# Patient Record
Sex: MALE | Race: WHITE | NOT HISPANIC OR LATINO | Employment: UNEMPLOYED | URBAN - METROPOLITAN AREA
[De-identification: names, ages, dates, MRNs, and addresses within clinical notes are randomized per-mention and may not be internally consistent; named-entity substitution may affect disease eponyms.]

---

## 2022-07-16 ENCOUNTER — HOSPITAL ENCOUNTER (INPATIENT)
Facility: HOSPITAL | Age: 47
LOS: 3 days | Discharge: LEFT BEFORE DISCHARGE | End: 2022-07-19
Attending: EMERGENCY MEDICINE | Admitting: INTERNAL MEDICINE

## 2022-07-16 ENCOUNTER — APPOINTMENT (OUTPATIENT)
Dept: CT IMAGING | Facility: HOSPITAL | Age: 47
End: 2022-07-16

## 2022-07-16 DIAGNOSIS — R93.5 ABNORMAL CT OF THE ABDOMEN: ICD-10-CM

## 2022-07-16 DIAGNOSIS — R17 JAUNDICE: ICD-10-CM

## 2022-07-16 DIAGNOSIS — R79.89 ELEVATED LFTS: Primary | ICD-10-CM

## 2022-07-16 LAB
ALBUMIN SERPL-MCNC: 3.7 G/DL (ref 3.5–5.2)
ALBUMIN/GLOB SERPL: 1.3 G/DL
ALP SERPL-CCNC: 198 U/L (ref 39–117)
ALT SERPL W P-5'-P-CCNC: 1433 U/L (ref 1–41)
ANION GAP SERPL CALCULATED.3IONS-SCNC: 10 MMOL/L (ref 5–15)
APAP SERPL-MCNC: <5 MCG/ML (ref 0–30)
AST SERPL-CCNC: 726 U/L (ref 1–40)
BACTERIA UR QL AUTO: ABNORMAL /HPF
BASOPHILS # BLD AUTO: 0.06 10*3/MM3 (ref 0–0.2)
BASOPHILS NFR BLD AUTO: 1.1 % (ref 0–1.5)
BILIRUB CONJ SERPL-MCNC: 8.1 MG/DL (ref 0–0.3)
BILIRUB SERPL-MCNC: 11.7 MG/DL (ref 0–1.2)
BILIRUB UR QL STRIP: ABNORMAL
BUN SERPL-MCNC: 9 MG/DL (ref 6–20)
BUN/CREAT SERPL: 10 (ref 7–25)
CALCIUM SPEC-SCNC: 8.7 MG/DL (ref 8.6–10.5)
CHLORIDE SERPL-SCNC: 104 MMOL/L (ref 98–107)
CLARITY UR: CLEAR
CO2 SERPL-SCNC: 24 MMOL/L (ref 22–29)
COLOR UR: ABNORMAL
CREAT SERPL-MCNC: 0.9 MG/DL (ref 0.76–1.27)
DEPRECATED RDW RBC AUTO: 43.5 FL (ref 37–54)
EGFRCR SERPLBLD CKD-EPI 2021: 106 ML/MIN/1.73
EOSINOPHIL # BLD AUTO: 0.11 10*3/MM3 (ref 0–0.4)
EOSINOPHIL NFR BLD AUTO: 2.1 % (ref 0.3–6.2)
ERYTHROCYTE [DISTWIDTH] IN BLOOD BY AUTOMATED COUNT: 13.7 % (ref 12.3–15.4)
GLOBULIN UR ELPH-MCNC: 2.9 GM/DL
GLUCOSE BLDC GLUCOMTR-MCNC: 96 MG/DL (ref 70–130)
GLUCOSE SERPL-MCNC: 112 MG/DL (ref 65–99)
GLUCOSE UR STRIP-MCNC: NEGATIVE MG/DL
HCT VFR BLD AUTO: 41.7 % (ref 37.5–51)
HGB BLD-MCNC: 13.8 G/DL (ref 13–17.7)
HGB UR QL STRIP.AUTO: NEGATIVE
HOLD SPECIMEN: NORMAL
HOLD SPECIMEN: NORMAL
HYALINE CASTS UR QL AUTO: ABNORMAL /LPF
IGG1 SER-MCNC: 1377 MG/DL (ref 700–1600)
IMM GRANULOCYTES # BLD AUTO: 0.02 10*3/MM3 (ref 0–0.05)
IMM GRANULOCYTES NFR BLD AUTO: 0.4 % (ref 0–0.5)
INR PPP: 1.03 (ref 0.9–1.1)
KETONES UR QL STRIP: NEGATIVE
LEUKOCYTE ESTERASE UR QL STRIP.AUTO: ABNORMAL
LIPASE SERPL-CCNC: 32 U/L (ref 13–60)
LYMPHOCYTES # BLD AUTO: 1.25 10*3/MM3 (ref 0.7–3.1)
LYMPHOCYTES NFR BLD AUTO: 23.3 % (ref 19.6–45.3)
MCH RBC QN AUTO: 29 PG (ref 26.6–33)
MCHC RBC AUTO-ENTMCNC: 33.1 G/DL (ref 31.5–35.7)
MCV RBC AUTO: 87.6 FL (ref 79–97)
MONOCYTES # BLD AUTO: 0.62 10*3/MM3 (ref 0.1–0.9)
MONOCYTES NFR BLD AUTO: 11.6 % (ref 5–12)
NEUTROPHILS NFR BLD AUTO: 3.3 10*3/MM3 (ref 1.7–7)
NEUTROPHILS NFR BLD AUTO: 61.5 % (ref 42.7–76)
NITRITE UR QL STRIP: POSITIVE
NRBC BLD AUTO-RTO: 0 /100 WBC (ref 0–0.2)
PH UR STRIP.AUTO: 6 [PH] (ref 5–8)
PHENYTOIN SERPL-MCNC: <0.8 MCG/ML (ref 10–20)
PLATELET # BLD AUTO: 192 10*3/MM3 (ref 140–450)
PMV BLD AUTO: 10.2 FL (ref 6–12)
POTASSIUM SERPL-SCNC: 3.7 MMOL/L (ref 3.5–5.2)
PROT SERPL-MCNC: 6.6 G/DL (ref 6–8.5)
PROT UR QL STRIP: ABNORMAL
PROTHROMBIN TIME: 13.4 SECONDS (ref 11.7–14.2)
RBC # BLD AUTO: 4.76 10*6/MM3 (ref 4.14–5.8)
RBC # UR STRIP: ABNORMAL /HPF
REF LAB TEST METHOD: ABNORMAL
SARS-COV-2 RNA PNL SPEC NAA+PROBE: NOT DETECTED
SODIUM SERPL-SCNC: 138 MMOL/L (ref 136–145)
SP GR UR STRIP: 1.02 (ref 1–1.03)
SQUAMOUS #/AREA URNS HPF: ABNORMAL /HPF
UROBILINOGEN UR QL STRIP: ABNORMAL
WBC # UR STRIP: ABNORMAL /HPF
WBC NRBC COR # BLD: 5.36 10*3/MM3 (ref 3.4–10.8)
WHOLE BLOOD HOLD COAG: NORMAL
WHOLE BLOOD HOLD SPECIMEN: NORMAL

## 2022-07-16 PROCEDURE — 80053 COMPREHEN METABOLIC PANEL: CPT | Performed by: EMERGENCY MEDICINE

## 2022-07-16 PROCEDURE — 80185 ASSAY OF PHENYTOIN TOTAL: CPT | Performed by: PHYSICIAN ASSISTANT

## 2022-07-16 PROCEDURE — 82962 GLUCOSE BLOOD TEST: CPT

## 2022-07-16 PROCEDURE — 83690 ASSAY OF LIPASE: CPT

## 2022-07-16 PROCEDURE — 99284 EMERGENCY DEPT VISIT MOD MDM: CPT

## 2022-07-16 PROCEDURE — 82248 BILIRUBIN DIRECT: CPT | Performed by: INTERNAL MEDICINE

## 2022-07-16 PROCEDURE — 87635 SARS-COV-2 COVID-19 AMP PRB: CPT | Performed by: PHYSICIAN ASSISTANT

## 2022-07-16 PROCEDURE — 80143 DRUG ASSAY ACETAMINOPHEN: CPT | Performed by: EMERGENCY MEDICINE

## 2022-07-16 PROCEDURE — 85025 COMPLETE CBC W/AUTO DIFF WBC: CPT

## 2022-07-16 PROCEDURE — 85610 PROTHROMBIN TIME: CPT | Performed by: PHYSICIAN ASSISTANT

## 2022-07-16 PROCEDURE — 74176 CT ABD & PELVIS W/O CONTRAST: CPT

## 2022-07-16 PROCEDURE — 82784 ASSAY IGA/IGD/IGG/IGM EACH: CPT | Performed by: INTERNAL MEDICINE

## 2022-07-16 PROCEDURE — 81001 URINALYSIS AUTO W/SCOPE: CPT | Performed by: EMERGENCY MEDICINE

## 2022-07-16 RX ORDER — PHENYTOIN 125 MG/5ML
300 SUSPENSION ORAL 3 TIMES DAILY
Status: DISCONTINUED | OUTPATIENT
Start: 2022-07-16 | End: 2022-07-17

## 2022-07-16 RX ORDER — INSULIN GLARGINE 100 [IU]/ML
10 INJECTION, SOLUTION SUBCUTANEOUS 2 TIMES DAILY
COMMUNITY

## 2022-07-16 RX ORDER — SODIUM CHLORIDE 0.9 % (FLUSH) 0.9 %
10 SYRINGE (ML) INJECTION EVERY 12 HOURS SCHEDULED
Status: DISCONTINUED | OUTPATIENT
Start: 2022-07-16 | End: 2022-07-19 | Stop reason: HOSPADM

## 2022-07-16 RX ORDER — SODIUM CHLORIDE 9 MG/ML
100 INJECTION, SOLUTION INTRAVENOUS CONTINUOUS
Status: DISCONTINUED | OUTPATIENT
Start: 2022-07-16 | End: 2022-07-19 | Stop reason: HOSPADM

## 2022-07-16 RX ORDER — SODIUM CHLORIDE 0.9 % (FLUSH) 0.9 %
10 SYRINGE (ML) INJECTION AS NEEDED
Status: DISCONTINUED | OUTPATIENT
Start: 2022-07-16 | End: 2022-07-19 | Stop reason: HOSPADM

## 2022-07-16 RX ORDER — MORPHINE SULFATE 2 MG/ML
1 INJECTION, SOLUTION INTRAMUSCULAR; INTRAVENOUS EVERY 4 HOURS PRN
Status: DISCONTINUED | OUTPATIENT
Start: 2022-07-16 | End: 2022-07-18

## 2022-07-16 RX ORDER — CHOLECALCIFEROL (VITAMIN D3) 125 MCG
5 CAPSULE ORAL NIGHTLY PRN
Status: DISCONTINUED | OUTPATIENT
Start: 2022-07-16 | End: 2022-07-19 | Stop reason: HOSPADM

## 2022-07-16 RX ORDER — TRAMADOL HYDROCHLORIDE 50 MG/1
50 TABLET ORAL EVERY 6 HOURS PRN
Status: DISCONTINUED | OUTPATIENT
Start: 2022-07-16 | End: 2022-07-18

## 2022-07-16 RX ORDER — NALOXONE HCL 0.4 MG/ML
0.4 VIAL (ML) INJECTION
Status: DISCONTINUED | OUTPATIENT
Start: 2022-07-16 | End: 2022-07-18

## 2022-07-16 RX ORDER — ONDANSETRON 2 MG/ML
4 INJECTION INTRAMUSCULAR; INTRAVENOUS EVERY 6 HOURS PRN
Status: DISCONTINUED | OUTPATIENT
Start: 2022-07-16 | End: 2022-07-19 | Stop reason: HOSPADM

## 2022-07-16 RX ORDER — INSULIN GLARGINE 100 [IU]/ML
10 INJECTION, SOLUTION SUBCUTANEOUS 2 TIMES DAILY
Status: DISCONTINUED | OUTPATIENT
Start: 2022-07-16 | End: 2022-07-16

## 2022-07-16 RX ORDER — AMOXICILLIN 250 MG
2 CAPSULE ORAL 2 TIMES DAILY
Status: DISCONTINUED | OUTPATIENT
Start: 2022-07-16 | End: 2022-07-19 | Stop reason: HOSPADM

## 2022-07-16 RX ORDER — INSULIN LISPRO 100 [IU]/ML
0-9 INJECTION, SOLUTION INTRAVENOUS; SUBCUTANEOUS
Status: DISCONTINUED | OUTPATIENT
Start: 2022-07-16 | End: 2022-07-17

## 2022-07-16 RX ORDER — NICOTINE POLACRILEX 4 MG
15 LOZENGE BUCCAL
Status: DISCONTINUED | OUTPATIENT
Start: 2022-07-16 | End: 2022-07-19 | Stop reason: HOSPADM

## 2022-07-16 RX ORDER — POLYETHYLENE GLYCOL 3350 17 G/17G
17 POWDER, FOR SOLUTION ORAL DAILY PRN
Status: DISCONTINUED | OUTPATIENT
Start: 2022-07-16 | End: 2022-07-19 | Stop reason: HOSPADM

## 2022-07-16 RX ORDER — DEXTROSE MONOHYDRATE 25 G/50ML
25 INJECTION, SOLUTION INTRAVENOUS
Status: DISCONTINUED | OUTPATIENT
Start: 2022-07-16 | End: 2022-07-19 | Stop reason: HOSPADM

## 2022-07-16 RX ORDER — BISACODYL 10 MG
10 SUPPOSITORY, RECTAL RECTAL DAILY PRN
Status: DISCONTINUED | OUTPATIENT
Start: 2022-07-16 | End: 2022-07-19 | Stop reason: HOSPADM

## 2022-07-16 RX ORDER — BISACODYL 5 MG/1
5 TABLET, DELAYED RELEASE ORAL DAILY PRN
Status: DISCONTINUED | OUTPATIENT
Start: 2022-07-16 | End: 2022-07-19 | Stop reason: HOSPADM

## 2022-07-16 RX ADMIN — PHENYTOIN 300 MG: 125 SUSPENSION ORAL at 20:45

## 2022-07-16 RX ADMIN — SODIUM CHLORIDE 100 ML/HR: 9 INJECTION, SOLUTION INTRAVENOUS at 18:07

## 2022-07-16 RX ADMIN — DOCUSATE SODIUM 50MG AND SENNOSIDES 8.6MG 2 TABLET: 8.6; 5 TABLET, FILM COATED ORAL at 20:45

## 2022-07-16 RX ADMIN — TRAMADOL HYDROCHLORIDE 50 MG: 50 TABLET, COATED ORAL at 20:54

## 2022-07-16 NOTE — PLAN OF CARE
Goal Outcome Evaluation:              Outcome Evaluation: Admitted to 4P from ER, jaundiced.  Alert and orientated x 4 yet, not consistent with responses to questions regarding pain and medical history.  Poor historian regarding pharmacy, ect.   Wife at bedside and helpful in assisting to answer some questions.  Eating food brought by wife, appears in no distress.  IV fluids, VSS.

## 2022-07-16 NOTE — ED NOTES
"Nursing report ED to floor  Odell Renteria  47 y.o.  male    HPI :   Chief Complaint   Patient presents with   • Jaundice       Admitting doctor:   Gustabo Guillen MD    Admitting diagnosis:   The primary encounter diagnosis was Elevated LFTs. Diagnoses of Jaundice and Abnormal CT of the abdomen were also pertinent to this visit.    Code status:   Current Code Status     Date Active Code Status Order ID Comments User Context       Not on file    Advance Care Planning Activity          Allergies:   Iodine and Penicillins    Intake and Output  No intake or output data in the 24 hours ending 07/16/22 1146    Weight:       07/16/22  0745   Weight: 90.7 kg (200 lb)       Most recent vitals:   Vitals:    07/16/22 0635 07/16/22 0745 07/16/22 0931   BP: 136/98  118/81   BP Location: Right arm     Patient Position: Standing     Pulse: 91  77   Resp: 16     Temp: 97.6 °F (36.4 °C)     TempSrc: Tympanic     SpO2: 99%  96%   Weight:  90.7 kg (200 lb)    Height: 175.3 cm (69\")         Active LDAs/IV Access:   Lines, Drains & Airways     Active LDAs     Name Placement date Placement time Site Days    Peripheral IV 07/16/22 0741 Right Antecubital 07/16/22 0741  Antecubital  less than 1                Labs (abnormal labs have a star):   Labs Reviewed   COMPREHENSIVE METABOLIC PANEL - Abnormal; Notable for the following components:       Result Value    Glucose 112 (*)     ALT (SGPT) 1,433 (*)     AST (SGOT) 726 (*)     Alkaline Phosphatase 198 (*)     Total Bilirubin 11.7 (*)     All other components within normal limits    Narrative:     GFR Normal >60  Chronic Kidney Disease <60  Kidney Failure <15     URINALYSIS W/ MICROSCOPIC IF INDICATED (NO CULTURE) - Abnormal; Notable for the following components:    Color, UA Dark Yellow (*)     Bilirubin, UA Moderate (2+) (*)     Protein, UA Trace (*)     Leuk Esterase, UA Small (1+) (*)     Nitrite, UA Positive (*)     All other components within normal limits   PHENYTOIN LEVEL, TOTAL - " Abnormal; Notable for the following components:    Phenytoin Level <0.8 (*)     All other components within normal limits   LIPASE - Normal   CBC WITH AUTO DIFFERENTIAL - Normal   PROTIME-INR - Normal   COVID PRE-OP / PRE-PROCEDURE SCREENING ORDER (NO ISOLATION)    Narrative:     The following orders were created for panel order COVID PRE-OP / PRE-PROCEDURE SCREENING ORDER (NO ISOLATION) - Swab, Nasopharynx.  Procedure                               Abnormality         Status                     ---------                               -----------         ------                     COVID-19,[facility]...[[id number]]                      In process                   Please view results for these tests on the individual orders.   COVID-19,[facility] CEPHEID/JESSY, NP SWAB IN TRANSPORT MEDIA 8-12 HR TAT   RAINBOW DRAW    Narrative:     The following orders were created for panel order [place] Draw.  Procedure                               Abnormality         Status                     ---------                               -----------         ------                     Green Top (Gel)[[id number]]                                  Final result               Lavender Top[[id number]]                                     Final result               Gold Top - SST[[id number]]                                   Final result               Light Blue Top[[id number]]                                   Final result                 Please view results for these tests on the individual orders.   ACETAMINOPHEN LEVEL   URINALYSIS, MICROSCOPIC ONLY   CBC AND DIFFERENTIAL    Narrative:     The following orders were created for panel order CBC & Differential.  Procedure                               Abnormality         Status                     ---------                               -----------         ------                     CBC Auto Differential[[id number]]        Normal              Final result                 Please view results  for these tests on the individual orders.   GREEN TOP   LAVENDER TOP   GOLD TOP - SST   LIGHT BLUE TOP       EKG:   No orders to display       Meds given in ED:   Medications   sodium chloride 0.9 % flush 10 mL (has no administration in time range)       Imaging results:  CT Abdomen Pelvis Without Contrast    Result Date: 7/16/2022   Inflammatory stranding in the region of the second portion of the duodenum, could be evidence of duodenitis.  No urolithiasis or hydronephrosis.  Splenomegaly.  This report was finalized on 7/16/2022 9:39 AM by Dr. Desean Coburn M.D.        Ambulatory status:   - ad edwin     Social issues:   Social History     Socioeconomic History   • Marital status:    Tobacco Use   • Smoking status: Unknown If Ever Smoked   Substance and Sexual Activity   • Alcohol use: Not Currently       NIH Stroke Scale:        Nursing report ED to floor:

## 2022-07-16 NOTE — ED NOTES
"Pt ambulatory to triage from home with c/o \"my whole body is yellow.\"  Pt denies any history of liver problems.  Pt has yellow tint to skin and sclera are yellow as well. Pt wearing mask in triage. Triage personnel wore appropriate PPE    "
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
## 2022-07-16 NOTE — ED PROVIDER NOTES
MD ATTESTATION NOTE    The ADAIR and I have discussed this patient's history, physical exam, and treatment plan.    I provided a substantive portion of the care of this patient. I personally performed the physical exam, in its entirety. The attached note describes my personal findings.      Odell Renteria is a 47 y.o. male who presents to the ED c/o jaundice.  He states is been ongoing for the past 2 days.  He reports having left lower quadrant abdominal pain.  He is a rather poor historian. He denies APAP use and any significant etoh use.      On exam:  GENERAL: not distressed  HENT: nares patent  EYES: +scleral icterus  CV: regular rhythm, regular rate  RESPIRATORY: normal effort, clear to auscultation bilaterally  ABDOMEN: soft, generalized abdominal tenderness  MUSCULOSKELETAL: no deformity  NEURO: alert, moves all extremities, follows commands  SKIN: warm, dry, jaundiced    Labs  Recent Results (from the past 24 hour(s))   Comprehensive Metabolic Panel    Collection Time: 07/16/22  7:44 AM    Specimen: Blood   Result Value Ref Range    Glucose 112 (H) 65 - 99 mg/dL    BUN 9 6 - 20 mg/dL    Creatinine 0.90 0.76 - 1.27 mg/dL    Sodium 138 136 - 145 mmol/L    Potassium 3.7 3.5 - 5.2 mmol/L    Chloride 104 98 - 107 mmol/L    CO2 24.0 22.0 - 29.0 mmol/L    Calcium 8.7 8.6 - 10.5 mg/dL    Total Protein 6.6 6.0 - 8.5 g/dL    Albumin 3.70 3.50 - 5.20 g/dL    ALT (SGPT) 1,433 (H) 1 - 41 U/L    AST (SGOT) 726 (H) 1 - 40 U/L    Alkaline Phosphatase 198 (H) 39 - 117 U/L    Total Bilirubin 11.7 (H) 0.0 - 1.2 mg/dL    Globulin 2.9 gm/dL    A/G Ratio 1.3 g/dL    BUN/Creatinine Ratio 10.0 7.0 - 25.0    Anion Gap 10.0 5.0 - 15.0 mmol/L    eGFR 106.0 >60.0 mL/min/1.73   Lipase    Collection Time: 07/16/22  7:44 AM    Specimen: Blood   Result Value Ref Range    Lipase 32 13 - 60 U/L   Green Top (Gel)    Collection Time: 07/16/22  7:44 AM   Result Value Ref Range    Extra Tube Hold for add-ons.    Lavender Top    Collection Time:  07/16/22  7:44 AM   Result Value Ref Range    Extra Tube hold for add-on    Gold Top - SST    Collection Time: 07/16/22  7:44 AM   Result Value Ref Range    Extra Tube Hold for add-ons.    Light Blue Top    Collection Time: 07/16/22  7:44 AM   Result Value Ref Range    Extra Tube Hold for add-ons.    CBC Auto Differential    Collection Time: 07/16/22  7:44 AM    Specimen: Blood   Result Value Ref Range    WBC 5.36 3.40 - 10.80 10*3/mm3    RBC 4.76 4.14 - 5.80 10*6/mm3    Hemoglobin 13.8 13.0 - 17.7 g/dL    Hematocrit 41.7 37.5 - 51.0 %    MCV 87.6 79.0 - 97.0 fL    MCH 29.0 26.6 - 33.0 pg    MCHC 33.1 31.5 - 35.7 g/dL    RDW 13.7 12.3 - 15.4 %    RDW-SD 43.5 37.0 - 54.0 fl    MPV 10.2 6.0 - 12.0 fL    Platelets 192 140 - 450 10*3/mm3    Neutrophil % 61.5 42.7 - 76.0 %    Lymphocyte % 23.3 19.6 - 45.3 %    Monocyte % 11.6 5.0 - 12.0 %    Eosinophil % 2.1 0.3 - 6.2 %    Basophil % 1.1 0.0 - 1.5 %    Immature Grans % 0.4 0.0 - 0.5 %    Neutrophils, Absolute 3.30 1.70 - 7.00 10*3/mm3    Lymphocytes, Absolute 1.25 0.70 - 3.10 10*3/mm3    Monocytes, Absolute 0.62 0.10 - 0.90 10*3/mm3    Eosinophils, Absolute 0.11 0.00 - 0.40 10*3/mm3    Basophils, Absolute 0.06 0.00 - 0.20 10*3/mm3    Immature Grans, Absolute 0.02 0.00 - 0.05 10*3/mm3    nRBC 0.0 0.0 - 0.2 /100 WBC   Protime-INR    Collection Time: 07/16/22  7:44 AM    Specimen: Blood   Result Value Ref Range    Protime 13.4 11.7 - 14.2 Seconds    INR 1.03 0.90 - 1.10   Phenytoin Level, Total    Collection Time: 07/16/22  7:44 AM    Specimen: Blood   Result Value Ref Range    Phenytoin Level <0.8 (L) 10.0 - 20.0 mcg/mL       Radiology  CT Abdomen Pelvis Without Contrast    Result Date: 7/16/2022  CT ABDOMEN PELVIS WO CONTRAST-  INDICATIONS: Jaundice, abdominal swelling  TECHNIQUE: Radiation dose reduction techniques were utilized, including automated exposure control and exposure modulation based on body size. Unenhanced ABDOMEN AND PELVIS CT  COMPARISON: None available   FINDINGS:  The spleen is enlarged, 14.1 cm.  The gallbladder is surgically absent.  The urinary bladder is mostly empty, limiting its assessment.  Otherwise unremarkable unenhanced appearance of the liver, spleen, adrenal glands, pancreas, kidneys, bladder.  No bowel obstruction or abnormal bowel thickening is identified. Inflammatory stranding is apparent in the region of the second portion of the duodenum, could be evidence of duodenitis. The appendix does not appear inflamed.  No free intraperitoneal gas or free fluid.  Borderline prominent portacaval lymph nodes are noted, for example 1.2 cm short axis on axial image 44, nonspecific, clinical correlation and follow-up suggested.  Abdominal aorta is not aneurysmal.  The lung bases are clear.  Degenerative changes are seen in the spine. No acute fracture is identified.         Inflammatory stranding in the region of the second portion of the duodenum, could be evidence of duodenitis.  No urolithiasis or hydronephrosis.  Splenomegaly.  This report was finalized on 7/16/2022 9:39 AM by Dr. Desean Coburn M.D.        Medical Decision Making:  ED Course as of 07/17/22 0011   Sat Jul 16, 2022   1100 Patient's ALT is in the 1400s, AST is in the 700s, and total bilirubin is 11.7.  His CT scan shows inflammatory changes around the duodenum although nonspecific as this can had to be noncontrasted due to his allergy.  CT does also show splenomegaly.  Given these nonspecific findings and clinical presentation he will be admitted for further work-up and evaluation.  Patient agrees plan of care. []   1119 Discussed the patient with Dr. Guillen who agrees to admit. []      ED Course User Index  [AH] Heather Burch PA           PPE: Both the patient and I wore a surgical mask throughout the entire patient encounter. I wore protective goggles.     Diagnosis  Final diagnoses:   Elevated LFTs   Jaundice   Abnormal CT of the abdomen        Wilfredo Murphy II,  MD  07/17/22 0011       Wilfredo Murphy II, MD  07/17/22 0012

## 2022-07-17 ENCOUNTER — APPOINTMENT (OUTPATIENT)
Dept: ULTRASOUND IMAGING | Facility: HOSPITAL | Age: 47
End: 2022-07-17

## 2022-07-17 ENCOUNTER — INPATIENT HOSPITAL (OUTPATIENT)
Dept: URBAN - METROPOLITAN AREA HOSPITAL 113 | Facility: HOSPITAL | Age: 47
End: 2022-07-17
Payer: COMMERCIAL

## 2022-07-17 DIAGNOSIS — R12 HEARTBURN: ICD-10-CM

## 2022-07-17 DIAGNOSIS — R11.10 VOMITING, UNSPECIFIED: ICD-10-CM

## 2022-07-17 DIAGNOSIS — Z90.49 ACQUIRED ABSENCE OF OTHER SPECIFIED PARTS OF DIGESTIVE TRACT: ICD-10-CM

## 2022-07-17 DIAGNOSIS — E80.6 OTHER DISORDERS OF BILIRUBIN METABOLISM: ICD-10-CM

## 2022-07-17 DIAGNOSIS — R74.01 ELEVATION OF LEVELS OF LIVER TRANSAMINASE LEVELS: ICD-10-CM

## 2022-07-17 PROBLEM — F20.9 SCHIZOPHRENIA: Status: ACTIVE | Noted: 2022-07-17

## 2022-07-17 PROBLEM — R56.9 SEIZURES: Status: ACTIVE | Noted: 2022-07-17

## 2022-07-17 PROBLEM — F31.9 BIPOLAR 1 DISORDER: Status: ACTIVE | Noted: 2022-07-17

## 2022-07-17 LAB
ALBUMIN SERPL-MCNC: 3.6 G/DL (ref 3.5–5.2)
ALBUMIN/GLOB SERPL: 1.2 G/DL
ALP SERPL-CCNC: 199 U/L (ref 39–117)
ALT SERPL W P-5'-P-CCNC: 1468 U/L (ref 1–41)
ANION GAP SERPL CALCULATED.3IONS-SCNC: 10 MMOL/L (ref 5–15)
AST SERPL-CCNC: 864 U/L (ref 1–40)
BASOPHILS # BLD AUTO: 0.06 10*3/MM3 (ref 0–0.2)
BASOPHILS NFR BLD AUTO: 1 % (ref 0–1.5)
BILIRUB CONJ SERPL-MCNC: >10 MG/DL (ref 0–0.3)
BILIRUB SERPL-MCNC: 13.3 MG/DL (ref 0–1.2)
BUN SERPL-MCNC: 7 MG/DL (ref 6–20)
BUN/CREAT SERPL: 8.3 (ref 7–25)
CALCIUM SPEC-SCNC: 8.9 MG/DL (ref 8.6–10.5)
CHLORIDE SERPL-SCNC: 105 MMOL/L (ref 98–107)
CO2 SERPL-SCNC: 24 MMOL/L (ref 22–29)
CREAT SERPL-MCNC: 0.84 MG/DL (ref 0.76–1.27)
DEPRECATED RDW RBC AUTO: 41.2 FL (ref 37–54)
EGFRCR SERPLBLD CKD-EPI 2021: 108.2 ML/MIN/1.73
EOSINOPHIL # BLD AUTO: 0.17 10*3/MM3 (ref 0–0.4)
EOSINOPHIL NFR BLD AUTO: 2.9 % (ref 0.3–6.2)
ERYTHROCYTE [DISTWIDTH] IN BLOOD BY AUTOMATED COUNT: 13.5 % (ref 12.3–15.4)
GLOBULIN UR ELPH-MCNC: 2.9 GM/DL
GLUCOSE BLDC GLUCOMTR-MCNC: 103 MG/DL (ref 70–130)
GLUCOSE BLDC GLUCOMTR-MCNC: 112 MG/DL (ref 70–130)
GLUCOSE BLDC GLUCOMTR-MCNC: 88 MG/DL (ref 70–130)
GLUCOSE BLDC GLUCOMTR-MCNC: 94 MG/DL (ref 70–130)
GLUCOSE SERPL-MCNC: 95 MG/DL (ref 65–99)
HAV IGM SERPL QL IA: ABNORMAL
HBA1C MFR BLD: 5 % (ref 4.8–5.6)
HBV CORE IGM SERPL QL IA: REACTIVE
HBV SURFACE AG SERPL QL IA: ABNORMAL
HBV SURFACE AG SERPL QL IA: NORMAL
HCT VFR BLD AUTO: 40.6 % (ref 37.5–51)
HCV AB SER DONR QL: ABNORMAL
HGB BLD-MCNC: 13.9 G/DL (ref 13–17.7)
IMM GRANULOCYTES # BLD AUTO: 0.02 10*3/MM3 (ref 0–0.05)
IMM GRANULOCYTES NFR BLD AUTO: 0.3 % (ref 0–0.5)
LYMPHOCYTES # BLD AUTO: 1.15 10*3/MM3 (ref 0.7–3.1)
LYMPHOCYTES NFR BLD AUTO: 19.8 % (ref 19.6–45.3)
MCH RBC QN AUTO: 29.1 PG (ref 26.6–33)
MCHC RBC AUTO-ENTMCNC: 34.2 G/DL (ref 31.5–35.7)
MCV RBC AUTO: 85.1 FL (ref 79–97)
MONOCYTES # BLD AUTO: 0.7 10*3/MM3 (ref 0.1–0.9)
MONOCYTES NFR BLD AUTO: 12 % (ref 5–12)
NEUTROPHILS NFR BLD AUTO: 3.71 10*3/MM3 (ref 1.7–7)
NEUTROPHILS NFR BLD AUTO: 64 % (ref 42.7–76)
NRBC BLD AUTO-RTO: 0 /100 WBC (ref 0–0.2)
PLATELET # BLD AUTO: 199 10*3/MM3 (ref 140–450)
PMV BLD AUTO: 10.6 FL (ref 6–12)
POTASSIUM SERPL-SCNC: 4.3 MMOL/L (ref 3.5–5.2)
PROT SERPL-MCNC: 6.5 G/DL (ref 6–8.5)
RBC # BLD AUTO: 4.77 10*6/MM3 (ref 4.14–5.8)
SODIUM SERPL-SCNC: 139 MMOL/L (ref 136–145)
WBC NRBC COR # BLD: 5.81 10*3/MM3 (ref 3.4–10.8)

## 2022-07-17 PROCEDURE — 86015 ACTIN ANTIBODY EACH: CPT | Performed by: INTERNAL MEDICINE

## 2022-07-17 PROCEDURE — 25010000002 ONDANSETRON PER 1 MG: Performed by: INTERNAL MEDICINE

## 2022-07-17 PROCEDURE — 83036 HEMOGLOBIN GLYCOSYLATED A1C: CPT | Performed by: INTERNAL MEDICINE

## 2022-07-17 PROCEDURE — 86381 MITOCHONDRIAL ANTIBODY EACH: CPT | Performed by: INTERNAL MEDICINE

## 2022-07-17 PROCEDURE — 87517 HEPATITIS B DNA QUANT: CPT | Performed by: INTERNAL MEDICINE

## 2022-07-17 PROCEDURE — 80074 ACUTE HEPATITIS PANEL: CPT | Performed by: INTERNAL MEDICINE

## 2022-07-17 PROCEDURE — 99222 1ST HOSP IP/OBS MODERATE 55: CPT | Performed by: NURSE PRACTITIONER

## 2022-07-17 PROCEDURE — 85025 COMPLETE CBC W/AUTO DIFF WBC: CPT | Performed by: INTERNAL MEDICINE

## 2022-07-17 PROCEDURE — 82962 GLUCOSE BLOOD TEST: CPT

## 2022-07-17 PROCEDURE — 82248 BILIRUBIN DIRECT: CPT | Performed by: INTERNAL MEDICINE

## 2022-07-17 PROCEDURE — 76705 ECHO EXAM OF ABDOMEN: CPT

## 2022-07-17 PROCEDURE — 87341 HEP B SURFACE AG NEUTRLZJ IA: CPT | Performed by: INTERNAL MEDICINE

## 2022-07-17 PROCEDURE — 80053 COMPREHEN METABOLIC PANEL: CPT | Performed by: INTERNAL MEDICINE

## 2022-07-17 RX ORDER — PHENYTOIN SODIUM 100 MG/1
200 CAPSULE, EXTENDED RELEASE ORAL EVERY 8 HOURS SCHEDULED
Status: DISCONTINUED | OUTPATIENT
Start: 2022-07-17 | End: 2022-07-19 | Stop reason: HOSPADM

## 2022-07-17 RX ORDER — INSULIN LISPRO 100 [IU]/ML
0-7 INJECTION, SOLUTION INTRAVENOUS; SUBCUTANEOUS
Status: DISCONTINUED | OUTPATIENT
Start: 2022-07-17 | End: 2022-07-19 | Stop reason: HOSPADM

## 2022-07-17 RX ORDER — FAMOTIDINE 20 MG/1
20 TABLET, FILM COATED ORAL
Status: DISCONTINUED | OUTPATIENT
Start: 2022-07-17 | End: 2022-07-19 | Stop reason: HOSPADM

## 2022-07-17 RX ORDER — DEXTROSE MONOHYDRATE 25 G/50ML
25 INJECTION, SOLUTION INTRAVENOUS
Status: DISCONTINUED | OUTPATIENT
Start: 2022-07-17 | End: 2022-07-19 | Stop reason: HOSPADM

## 2022-07-17 RX ORDER — NICOTINE POLACRILEX 4 MG
15 LOZENGE BUCCAL
Status: DISCONTINUED | OUTPATIENT
Start: 2022-07-17 | End: 2022-07-19 | Stop reason: HOSPADM

## 2022-07-17 RX ORDER — CALCIUM CARBONATE 200(500)MG
2 TABLET,CHEWABLE ORAL 3 TIMES DAILY PRN
Status: DISCONTINUED | OUTPATIENT
Start: 2022-07-17 | End: 2022-07-19 | Stop reason: HOSPADM

## 2022-07-17 RX ADMIN — DOCUSATE SODIUM 50MG AND SENNOSIDES 8.6MG 2 TABLET: 8.6; 5 TABLET, FILM COATED ORAL at 22:08

## 2022-07-17 RX ADMIN — SODIUM CHLORIDE 100 ML/HR: 9 INJECTION, SOLUTION INTRAVENOUS at 03:00

## 2022-07-17 RX ADMIN — TRAMADOL HYDROCHLORIDE 50 MG: 50 TABLET, COATED ORAL at 22:08

## 2022-07-17 RX ADMIN — PHENYTOIN SODIUM 200 MG: 100 CAPSULE, EXTENDED RELEASE ORAL at 22:09

## 2022-07-17 RX ADMIN — ONDANSETRON 4 MG: 2 INJECTION INTRAMUSCULAR; INTRAVENOUS at 06:41

## 2022-07-17 RX ADMIN — Medication 5 MG: at 22:08

## 2022-07-17 RX ADMIN — FAMOTIDINE 20 MG: 20 TABLET ORAL at 17:34

## 2022-07-17 RX ADMIN — PHENYTOIN SODIUM 200 MG: 100 CAPSULE, EXTENDED RELEASE ORAL at 15:02

## 2022-07-17 RX ADMIN — SODIUM CHLORIDE 100 ML/HR: 9 INJECTION, SOLUTION INTRAVENOUS at 15:00

## 2022-07-17 RX ADMIN — Medication 10 ML: at 22:08

## 2022-07-17 NOTE — PLAN OF CARE
Goal Outcome Evaluation:      Patient rested comfortably this shift. Tramadol given x1 for pain and to help aid with sleep. NS continues at 100mL/hr. Dilantin given as ordered and side rails padded. Wife remains at bedside. Will ctm.
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
## 2022-07-17 NOTE — PAYOR COMM NOTE
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 g/dL 3.60 3.70   BILIRUBIN  mg/dL 13.3* 11.7*   ALK PHOS U/L 199* 198*   AST (SGOT) U/L 864* 726*   ALT (SGPT) U/L 1,468* 1,433*       Amylase and Lipase  Results from last 7 days   Lab Units 22  0744   LIPASE U/L 32       CRP         Imaging Results (Last 24 Hours)     ** No results found for the last 24 hours. **            ASSESSMENT:  47 y.o. male with history of cholecystectomy, seizures, and DM who presents with complaint of jaundice. LFTs elevated, HBV core IgM reactive.  - transaminitis, hyperbilirubinemia - possibly due to acute hepatitis B  - jaundice - r/t above  - vomiting, heartburn  - s/p cholecystectomy       PLAN:  Additional hepatitis B serologies ordered. Check US of liver as well.  ID is consulted.  Give Pepcid BID. Diet as tolerated.   GI will follow. We appreciate the referral.    CAT Snyder  22  11:33 EDT              Electronically signed by Sudheer Lucas MD at 22 1709     Amrita Monique APRN at 22 1030      Consult Orders    1. Inpatient Urology Consult [[id number]] ordered by Dianne La APRN at 22 0903                      FIRST UROLOGY CONSULT      Patient Identification:  NAME:  Odell Renteria  Age:  47 y.o.   Sex:  male   :  1975   MRN:  [id number]       Chief complaint: Jaundice    History of present illness:  Patient is 47 y.o who presented to ED for jaundice and lower abd pain.  He has been seen by our office several times with gross hematuria.  He is suppose to have CT, cytology and cysto done.  CT was done at hospital and is unremarkable from  standpoint.  Patient story changes as he tells it.  States he is having a 'specialist flown in' today to treat his kidneys and abd area.  He states he is peeing blood and has cysto scheduled Monday.  However our records do not indicate that this is scheduled.  Patient is a bit argumentative about his care, and much of his story doesn't make sense.      Past medical history:  Past Medical History:   Diagnosis  Date   • Bipolar disorder (HCC)    • Diabetes mellitus (HCC)    • Schizophrenia (HCC)    • Seizures (HCC)        Past surgical history:  Past Surgical History:   Procedure Laterality Date   • CHOLECYSTECTOMY         Allergies:  Fish-derived products, Latex, Iodine, and Penicillins    Home medications:  Medications Prior to Admission   Medication Sig Dispense Refill Last Dose   • insulin glargine (LANTUS, SEMGLEE) 100 UNIT/ML injection Inject 10 Units under the skin into the appropriate area as directed 2 (Two) Times a Day.   7/15/2022 at 2100   • Phenytoin (DILANTIN PO) Take 200 mg by mouth 3 (Three) Times a Day.   2022 at Unknown time        Hospital medications:  insulin glargine, 10 Units, Subcutaneous, Q12H  insulin lispro, 0-7 Units, Subcutaneous, TID AC  insulin lispro, 0-9 Units, Subcutaneous, TID With Meals  phenytoin, 200 mg, Oral, Q8H  senna-docusate sodium, 2 tablet, Oral, BID  sodium chloride, 10 mL, Intravenous, Q12H      sodium chloride, 100 mL/hr, Last Rate: 100 mL/hr (22 0852)      •  senna-docusate sodium **AND** polyethylene glycol **AND** bisacodyl **AND** bisacodyl  •  calcium carbonate  •  dextrose  •  dextrose  •  dextrose  •  dextrose  •  glucagon (human recombinant)  •  glucagon (human recombinant)  •  melatonin  •  Morphine **AND** naloxone  •  ondansetron  •  sodium chloride  •  sodium chloride  •  traMADol    Family history:  History reviewed. No pertinent family history.    Social history:  Social History     Tobacco Use   • Smoking status: Never Smoker   Substance Use Topics   • Alcohol use: Not Currently       Review of systems:      Positive for:  HEP B  Negative for:  No chest pain    Objective:  TMax 24 hours:   Temp (24hrs), Av °F (36.7 °C), Min:97.6 °F (36.4 °C), Max:98.3 °F (36.8 °C)      Vitals Ranges:   Temp:  [97.6 °F (36.4 °C)-98.3 °F (36.8 °C)] 98.3 °F (36.8 °C)  Heart Rate:  [65-71] 68  Resp:  [16-18] 16  BP: (105-126)/(64-82) 119/78    Intake/Output Last 3  shifts:  I/O last 3 completed shifts:  In: 240 [P.O.:240]  Out: -      Physical Exam:    General Appearance:    Alert, cooperative, NAD   HEENT:    No trauma, pupils reactive, hearing intact   Back:     No CVA tenderness   Lungs:     Respirations unlabored, no wheezing    Heart:    RRR, intact peripheral pulses   Abdomen:     Soft, NDNT, no masses, no guarding   :    Testes descended bilaterally, no nodules.  Penis normal.  No scrotal or penile rashes noted   Extremities:   No edema, no deformity   Lymphatic:   No neck or groin LAD   Skin:   No bleeding, bruising or rashes   Neuro/Psych:   Orientation intact, mood/affect pleasant, no focal findings       Results review:   I reviewed the patient's new clinical results.    Data review:  Lab Results (last 24 hours)     Procedure Component Value Units Date/Time    Hepatitis B Surface Antigen [[id number]] Updated: 07/17/22 0855    Specimen: Blood     Hepatitis Panel, Acute [[id number]]  (Abnormal) Collected: 07/17/22 0641    Specimen: Blood Updated: 07/17/22 0855     Hepatitis B Surface Ag --     Hep A IgM Non-Reactive     Hep B C IgM Reactive     Hepatitis C Ab Non-Reactive    Narrative:      Results may be falsely decreased if patient taking Biotin.   Preliminary reactive result pending confirmation at [facility].     Comprehensive Metabolic Panel [[id number]]  (Abnormal) Collected: 07/17/22 0641    Specimen: Blood Updated: 07/17/22 0851     Glucose 95 mg/dL      BUN 7 mg/dL      Creatinine 0.84 mg/dL      Sodium 139 mmol/L      Potassium 4.3 mmol/L      Chloride 105 mmol/L      CO2 24.0 mmol/L      Calcium 8.9 mg/dL      Total Protein 6.5 g/dL      Albumin 3.60 g/dL      ALT (SGPT) 1,468 U/L      AST (SGOT) 864 U/L      Alkaline Phosphatase 199 U/L      Total Bilirubin 13.3 mg/dL      Globulin 2.9 gm/dL      A/G Ratio 1.2 g/dL      BUN/Creatinine Ratio 8.3     Anion Gap 10.0 mmol/L      eGFR 108.2 mL/min/1.73      Comment: National Kidney Foundation and American Society  of Nephrology (ASN) Task Force recommended calculation based on the Chronic Kidney Disease Epidemiology Collaboration (CKD-EPI) equation refit without adjustment for race.       Narrative:      GFR Normal >60  Chronic Kidney Disease <60  Kidney Failure <15      Bilirubin, Direct [[id number]]  (Abnormal) Collected: 07/17/22 0641    Specimen: Blood Updated: 07/17/22 0743     Bilirubin, Direct >10.0 mg/dL     Hemoglobin A1c [[id number]]  (Normal) Collected: 07/17/22 0641    Specimen: Blood Updated: 07/17/22 0739     Hemoglobin A1C 5.00 %     Narrative:      Hemoglobin A1C Ranges:    Increased Risk for Diabetes  5.7% to 6.4%  Diabetes                     >= 6.5%  Diabetic Goal                < 7.0%    CBC & Differential [[id number]]  (Normal) Collected: 07/17/22 0641    Specimen: Blood Updated: 07/17/22 0722    Narrative:      The following orders were created for panel order CBC & Differential.  Procedure                               Abnormality         Status                     ---------                               -----------         ------                     CBC Auto Differential[[id number]]        Normal              Final result                 Please view results for these tests on the individual orders.    CBC Auto Differential [[id number]]  (Normal) Collected: 07/17/22 0641    Specimen: Blood Updated: 07/17/22 0722     WBC 5.81 10*3/mm3      RBC 4.77 10*6/mm3      Hemoglobin 13.9 g/dL      Hematocrit 40.6 %      MCV 85.1 fL      MCH 29.1 pg      MCHC 34.2 g/dL      RDW 13.5 %      RDW-SD 41.2 fl      MPV 10.6 fL      Platelets 199 10*3/mm3      Neutrophil % 64.0 %      Lymphocyte % 19.8 %      Monocyte % 12.0 %      Eosinophil % 2.9 %      Basophil % 1.0 %      Immature Grans % 0.3 %      Neutrophils, Absolute 3.71 10*3/mm3      Lymphocytes, Absolute 1.15 10*3/mm3      Monocytes, Absolute 0.70 10*3/mm3      Eosinophils, Absolute 0.17 10*3/mm3      Basophils, Absolute 0.06 10*3/mm3      Immature Grans, Absolute  0.02 10*3/mm3      nRBC 0.0 /100 WBC     Mitochondrial Antibodies, M2 [[id number]] Collected: 07/17/22 0641    Specimen: Blood Updated: 07/17/22 0659    Anti-Smooth Muscle Antibody Titer [[id number]] Collected: 07/17/22 0641    Specimen: Blood Updated: 07/17/22 0659    POC Glucose Once [[id number]]  (Normal) Collected: 07/17/22 0632    Specimen: Blood Updated: 07/17/22 0633     Glucose 94 mg/dL      Comment: Meter: [id number] : [id number] Sundarnunoyovanny Ina DARCI       POC Glucose Once [[id number]]  (Normal) Collected: 07/16/22 1944    Specimen: Blood Updated: 07/16/22 1946     Glucose 96 mg/dL      Comment: Meter: [id number] : [id number] Tania Waldron RN       IgG [[id number]]  (Normal) Collected: 07/16/22 0744    Specimen: Blood Updated: 07/16/22 1818     IgG 1,377 mg/dL     Bilirubin, Direct [[id number]]  (Abnormal) Collected: 07/16/22 0744    Specimen: Blood Updated: 07/16/22 1804     Bilirubin, Direct 8.1 mg/dL     Acetaminophen Level [[id number]]  (Normal) Collected: 07/16/22 0744    Specimen: Blood Updated: 07/16/22 1431     Acetaminophen <5.0 mcg/mL     Urinalysis, Microscopic Only - Urine, Clean Catch [[id number]]  (Abnormal) Collected: 07/16/22 1125    Specimen: Urine, Clean Catch Updated: 07/16/22 1157     RBC, UA None Seen /HPF      WBC, UA 6-12 /HPF      Bacteria, UA Trace /HPF      Squamous Epithelial Cells, UA 3-6 /HPF      Hyaline Casts, UA 3-6 /LPF      Methodology Manual Light Microscopy    COVID PRE-OP / PRE-PROCEDURE SCREENING ORDER (NO ISOLATION) - Swab, Nasopharynx [[id number]]  (Normal) Collected: 07/16/22 1124    Specimen: Swab from Nasopharynx Updated: 07/16/22 1155    Narrative:      The following orders were created for panel order COVID PRE-OP / PRE-PROCEDURE SCREENING ORDER (NO ISOLATION) - Swab, Nasopharynx.  Procedure                               Abnormality         Status                     ---------                               -----------         ------                      COVID-19,[facility]...[[id number]]  Normal              Final result                 Please view results for these tests on the individual orders.    COVID-19,[facility] CEPHEID/JESSY NP SWAB IN TRANSPORT MEDIA 8-12 HR TAT - Swab, Nasopharynx [[id number]]  (Normal) Collected: 07/16/22 1124    Specimen: Swab from Nasopharynx Updated: 07/16/22 1155     COVID19 Not Detected    Narrative:      Fact sheet for providers: https://www.fda.gov/media/150726/download    Fact sheet for patients: https://www.fda.gov/media/224413/download    Test performed by PCR.    Urinalysis With Microscopic If Indicated (No Culture) - Urine, Clean Catch [[id number]]  (Abnormal) Collected: 07/16/22 1125    Specimen: Urine, Clean Catch Updated: 07/16/22 1142     Color, UA Dark Yellow     Appearance, UA Clear     pH, UA 6.0     Specific Gravity, UA 1.025     Glucose, UA Negative     Ketones, UA Negative     Bilirubin, UA Moderate (2+)     Blood, UA Negative     Protein, UA Trace     Leuk Esterase, UA Small (1+)     Nitrite, UA Positive     Urobilinogen, UA 1.0 E.U./dL           Imaging:  Imaging Results (Last 24 Hours)     ** No results found for the last 24 hours. **             Assessment:       Elevated LFTs    Schizophrenia (HCC)    Seizures (HCC)    Bipolar 1 disorder (HCC)        Plan:     Hematuria-needs cysto but can be done as outpatient  No further eval from urology    CAT Cano  07/17/22  10:30 EDT              Electronically signed by Amrita Monique APRN at 07/17/22 1037
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
## 2022-07-17 NOTE — CONSULTS
FIRST UROLOGY CONSULT      Patient Identification:  NAME:  Odell Renteria  Age:  47 y.o.   Sex:  male   :  1975   MRN:  [id number]       Chief complaint: Jaundice    History of present illness:  Patient is 47 y.o who presented to ED for jaundice and lower abd pain.  He has been seen by our office several times with gross hematuria.  He is suppose to have CT, cytology and cysto done.  CT was done at hospital and is unremarkable from  standpoint.  Patient story changes as he tells it.  States he is having a 'specialist flown in' today to treat his kidneys and abd area.  He states he is peeing blood and has cysto scheduled Monday.  However our records do not indicate that this is scheduled.  Patient is a bit argumentative about his care, and much of his story doesn't make sense.      Past medical history:  Past Medical History:   Diagnosis Date   • Bipolar disorder (HCC)    • Diabetes mellitus (HCC)    • Schizophrenia (HCC)    • Seizures (HCC)        Past surgical history:  Past Surgical History:   Procedure Laterality Date   • CHOLECYSTECTOMY         Allergies:  Fish-derived products, Latex, Iodine, and Penicillins    Home medications:  Medications Prior to Admission   Medication Sig Dispense Refill Last Dose   • insulin glargine (LANTUS, SEMGLEE) 100 UNIT/ML injection Inject 10 Units under the skin into the appropriate area as directed 2 (Two) Times a Day.   7/15/2022 at 2100   • Phenytoin (DILANTIN PO) Take 200 mg by mouth 3 (Three) Times a Day.   2022 at Unknown time        Hospital medications:  insulin glargine, 10 Units, Subcutaneous, Q12H  insulin lispro, 0-7 Units, Subcutaneous, TID AC  insulin lispro, 0-9 Units, Subcutaneous, TID With Meals  phenytoin, 200 mg, Oral, Q8H  senna-docusate sodium, 2 tablet, Oral, BID  sodium chloride, 10 mL, Intravenous, Q12H      sodium chloride, 100 mL/hr, Last Rate: 100 mL/hr (22 0852)      •  senna-docusate sodium **AND** polyethylene glycol **AND**  bisacodyl **AND** bisacodyl  •  calcium carbonate  •  dextrose  •  dextrose  •  dextrose  •  dextrose  •  glucagon (human recombinant)  •  glucagon (human recombinant)  •  melatonin  •  Morphine **AND** naloxone  •  ondansetron  •  sodium chloride  •  sodium chloride  •  traMADol    Family history:  History reviewed. No pertinent family history.    Social history:  Social History     Tobacco Use   • Smoking status: Never Smoker   Substance Use Topics   • Alcohol use: Not Currently       Review of systems:      Positive for:  HEP B  Negative for:  No chest pain    Objective:  TMax 24 hours:   Temp (24hrs), Av °F (36.7 °C), Min:97.6 °F (36.4 °C), Max:98.3 °F (36.8 °C)      Vitals Ranges:   Temp:  [97.6 °F (36.4 °C)-98.3 °F (36.8 °C)] 98.3 °F (36.8 °C)  Heart Rate:  [65-71] 68  Resp:  [16-18] 16  BP: (105-126)/(64-82) 119/78    Intake/Output Last 3 shifts:  I/O last 3 completed shifts:  In: 240 [P.O.:240]  Out: -      Physical Exam:    General Appearance:    Alert, cooperative, NAD   HEENT:    No trauma, pupils reactive, hearing intact   Back:     No CVA tenderness   Lungs:     Respirations unlabored, no wheezing    Heart:    RRR, intact peripheral pulses   Abdomen:     Soft, NDNT, no masses, no guarding   :    Testes descended bilaterally, no nodules.  Penis normal.  No scrotal or penile rashes noted   Extremities:   No edema, no deformity   Lymphatic:   No neck or groin LAD   Skin:   No bleeding, bruising or rashes   Neuro/Psych:   Orientation intact, mood/affect pleasant, no focal findings       Results review:   I reviewed the patient's new clinical results.    Data review:  Lab Results (last 24 hours)     Procedure Component Value Units Date/Time    Hepatitis B Surface Antigen [[id number]] Updated: 22    Specimen: Blood     Hepatitis Panel, Acute [[id number]]  (Abnormal) Collected: 22    Specimen: Blood Updated: 22     Hepatitis B Surface Ag --     Hep A IgM Non-Reactive      Hep B C IgM Reactive     Hepatitis C Ab Non-Reactive    Narrative:      Results may be falsely decreased if patient taking Biotin.   Preliminary reactive result pending confirmation at [facility].     Comprehensive Metabolic Panel [[id number]]  (Abnormal) Collected: 07/17/22 0641    Specimen: Blood Updated: 07/17/22 0851     Glucose 95 mg/dL      BUN 7 mg/dL      Creatinine 0.84 mg/dL      Sodium 139 mmol/L      Potassium 4.3 mmol/L      Chloride 105 mmol/L      CO2 24.0 mmol/L      Calcium 8.9 mg/dL      Total Protein 6.5 g/dL      Albumin 3.60 g/dL      ALT (SGPT) 1,468 U/L      AST (SGOT) 864 U/L      Alkaline Phosphatase 199 U/L      Total Bilirubin 13.3 mg/dL      Globulin 2.9 gm/dL      A/G Ratio 1.2 g/dL      BUN/Creatinine Ratio 8.3     Anion Gap 10.0 mmol/L      eGFR 108.2 mL/min/1.73      Comment: National Kidney Foundation and American Society of Nephrology (ASN) Task Force recommended calculation based on the Chronic Kidney Disease Epidemiology Collaboration (CKD-EPI) equation refit without adjustment for race.       Narrative:      GFR Normal >60  Chronic Kidney Disease <60  Kidney Failure <15      Bilirubin, Direct [[id number]]  (Abnormal) Collected: 07/17/22 0641    Specimen: Blood Updated: 07/17/22 0743     Bilirubin, Direct >10.0 mg/dL     Hemoglobin A1c [[id number]]  (Normal) Collected: 07/17/22 0641    Specimen: Blood Updated: 07/17/22 0739     Hemoglobin A1C 5.00 %     Narrative:      Hemoglobin A1C Ranges:    Increased Risk for Diabetes  5.7% to 6.4%  Diabetes                     >= 6.5%  Diabetic Goal                < 7.0%    CBC & Differential [[id number]]  (Normal) Collected: 07/17/22 0641    Specimen: Blood Updated: 07/17/22 0722    Narrative:      The following orders were created for panel order CBC & Differential.  Procedure                               Abnormality         Status                     ---------                               -----------         ------                     CBC Auto  Differential[[id number]]        Normal              Final result                 Please view results for these tests on the individual orders.    CBC Auto Differential [[id number]]  (Normal) Collected: 07/17/22 0641    Specimen: Blood Updated: 07/17/22 0722     WBC 5.81 10*3/mm3      RBC 4.77 10*6/mm3      Hemoglobin 13.9 g/dL      Hematocrit 40.6 %      MCV 85.1 fL      MCH 29.1 pg      MCHC 34.2 g/dL      RDW 13.5 %      RDW-SD 41.2 fl      MPV 10.6 fL      Platelets 199 10*3/mm3      Neutrophil % 64.0 %      Lymphocyte % 19.8 %      Monocyte % 12.0 %      Eosinophil % 2.9 %      Basophil % 1.0 %      Immature Grans % 0.3 %      Neutrophils, Absolute 3.71 10*3/mm3      Lymphocytes, Absolute 1.15 10*3/mm3      Monocytes, Absolute 0.70 10*3/mm3      Eosinophils, Absolute 0.17 10*3/mm3      Basophils, Absolute 0.06 10*3/mm3      Immature Grans, Absolute 0.02 10*3/mm3      nRBC 0.0 /100 WBC     Mitochondrial Antibodies, M2 [[id number]] Collected: 07/17/22 0641    Specimen: Blood Updated: 07/17/22 0659    Anti-Smooth Muscle Antibody Titer [[id number]] Collected: 07/17/22 0641    Specimen: Blood Updated: 07/17/22 0659    POC Glucose Once [[id number]]  (Normal) Collected: 07/17/22 0632    Specimen: Blood Updated: 07/17/22 0633     Glucose 94 mg/dL      Comment: Meter: [id number] : [id number] Joel BEJARANO       POC Glucose Once [[id number]]  (Normal) Collected: 07/16/22 1944    Specimen: Blood Updated: 07/16/22 1946     Glucose 96 mg/dL      Comment: Meter: [id number] : [id number] Tania Waldron RN       IgG [[id number]]  (Normal) Collected: 07/16/22 0744    Specimen: Blood Updated: 07/16/22 1818     IgG 1,377 mg/dL     Bilirubin, Direct [[id number]]  (Abnormal) Collected: 07/16/22 0744    Specimen: Blood Updated: 07/16/22 1804     Bilirubin, Direct 8.1 mg/dL     Acetaminophen Level [[id number]]  (Normal) Collected: 07/16/22 0744    Specimen: Blood Updated: 07/16/22 1431     Acetaminophen <5.0 mcg/mL      Urinalysis, Microscopic Only - Urine, Clean Catch [[id number]]  (Abnormal) Collected: 07/16/22 1125    Specimen: Urine, Clean Catch Updated: 07/16/22 1157     RBC, UA None Seen /HPF      WBC, UA 6-12 /HPF      Bacteria, UA Trace /HPF      Squamous Epithelial Cells, UA 3-6 /HPF      Hyaline Casts, UA 3-6 /LPF      Methodology Manual Light Microscopy    COVID PRE-OP / PRE-PROCEDURE SCREENING ORDER (NO ISOLATION) - Swab, Nasopharynx [[id number]]  (Normal) Collected: 07/16/22 1124    Specimen: Swab from Nasopharynx Updated: 07/16/22 1155    Narrative:      The following orders were created for panel order COVID PRE-OP / PRE-PROCEDURE SCREENING ORDER (NO ISOLATION) - Swab, Nasopharynx.  Procedure                               Abnormality         Status                     ---------                               -----------         ------                     COVID-19,[facility]...[[id number]]  Normal              Final result                 Please view results for these tests on the individual orders.    COVID-19,[facility] CEPHEID/JESSY NP SWAB IN TRANSPORT MEDIA 8-12 HR TAT - Swab, Nasopharynx [[id number]]  (Normal) Collected: 07/16/22 1124    Specimen: Swab from Nasopharynx Updated: 07/16/22 1155     COVID19 Not Detected    Narrative:      Fact sheet for providers: https://www.fda.gov/media/746184/download    Fact sheet for patients: https://www.fda.gov/media/846765/download    Test performed by PCR.    Urinalysis With Microscopic If Indicated (No Culture) - Urine, Clean Catch [[id number]]  (Abnormal) Collected: 07/16/22 1125    Specimen: Urine, Clean Catch Updated: 07/16/22 1142     Color, UA Dark Yellow     Appearance, UA Clear     pH, UA 6.0     Specific Gravity, UA 1.025     Glucose, UA Negative     Ketones, UA Negative     Bilirubin, UA Moderate (2+)     Blood, UA Negative     Protein, UA Trace     Leuk Esterase, UA Small (1+)     Nitrite, UA Positive     Urobilinogen, UA 1.0 E.U./dL            Imaging:  Imaging Results (Last 24 Hours)     ** No results found for the last 24 hours. **             Assessment:       Elevated LFTs    Schizophrenia (HCC)    Seizures (HCC)    Bipolar 1 disorder (HCC)        Plan:     Hematuria-needs cysto but can be done as outpatient  No further eval from urology    Amrita Monique, APRMORAIMA  07/17/22  10:30 EDT

## 2022-07-18 LAB
ALBUMIN SERPL-MCNC: 3.4 G/DL (ref 3.5–5.2)
ALBUMIN/GLOB SERPL: 1.2 G/DL
ALP SERPL-CCNC: 183 U/L (ref 39–117)
ALT SERPL W P-5'-P-CCNC: 1434 U/L (ref 1–41)
ANION GAP SERPL CALCULATED.3IONS-SCNC: 9 MMOL/L (ref 5–15)
AST SERPL-CCNC: 810 U/L (ref 1–40)
BASOPHILS # BLD AUTO: 0.05 10*3/MM3 (ref 0–0.2)
BASOPHILS NFR BLD AUTO: 0.9 % (ref 0–1.5)
BILIRUB SERPL-MCNC: 14.2 MG/DL (ref 0–1.2)
BUN SERPL-MCNC: 6 MG/DL (ref 6–20)
BUN/CREAT SERPL: 7.5 (ref 7–25)
CALCIUM SPEC-SCNC: 8.7 MG/DL (ref 8.6–10.5)
CHLORIDE SERPL-SCNC: 104 MMOL/L (ref 98–107)
CO2 SERPL-SCNC: 25 MMOL/L (ref 22–29)
CREAT SERPL-MCNC: 0.8 MG/DL (ref 0.76–1.27)
DEPRECATED RDW RBC AUTO: 44.6 FL (ref 37–54)
EGFRCR SERPLBLD CKD-EPI 2021: 109.8 ML/MIN/1.73
EOSINOPHIL # BLD AUTO: 0.16 10*3/MM3 (ref 0–0.4)
EOSINOPHIL NFR BLD AUTO: 3 % (ref 0.3–6.2)
ERYTHROCYTE [DISTWIDTH] IN BLOOD BY AUTOMATED COUNT: 13.8 % (ref 12.3–15.4)
GLOBULIN UR ELPH-MCNC: 2.8 GM/DL
GLUCOSE BLDC GLUCOMTR-MCNC: 105 MG/DL (ref 70–130)
GLUCOSE BLDC GLUCOMTR-MCNC: 108 MG/DL (ref 70–130)
GLUCOSE BLDC GLUCOMTR-MCNC: 72 MG/DL (ref 70–130)
GLUCOSE BLDC GLUCOMTR-MCNC: 94 MG/DL (ref 70–130)
GLUCOSE SERPL-MCNC: 101 MG/DL (ref 65–99)
HCT VFR BLD AUTO: 40.2 % (ref 37.5–51)
HGB BLD-MCNC: 13.2 G/DL (ref 13–17.7)
IMM GRANULOCYTES # BLD AUTO: 0.02 10*3/MM3 (ref 0–0.05)
IMM GRANULOCYTES NFR BLD AUTO: 0.4 % (ref 0–0.5)
INR PPP: 1.05 (ref 0.9–1.1)
LYMPHOCYTES # BLD AUTO: 1.3 10*3/MM3 (ref 0.7–3.1)
LYMPHOCYTES NFR BLD AUTO: 24.6 % (ref 19.6–45.3)
MCH RBC QN AUTO: 28.9 PG (ref 26.6–33)
MCHC RBC AUTO-ENTMCNC: 32.8 G/DL (ref 31.5–35.7)
MCV RBC AUTO: 88 FL (ref 79–97)
MONOCYTES # BLD AUTO: 0.6 10*3/MM3 (ref 0.1–0.9)
MONOCYTES NFR BLD AUTO: 11.3 % (ref 5–12)
NEUTROPHILS NFR BLD AUTO: 3.16 10*3/MM3 (ref 1.7–7)
NEUTROPHILS NFR BLD AUTO: 59.8 % (ref 42.7–76)
NRBC BLD AUTO-RTO: 0 /100 WBC (ref 0–0.2)
PLATELET # BLD AUTO: 181 10*3/MM3 (ref 140–450)
PMV BLD AUTO: 10.5 FL (ref 6–12)
POTASSIUM SERPL-SCNC: 4.4 MMOL/L (ref 3.5–5.2)
PROT SERPL-MCNC: 6.2 G/DL (ref 6–8.5)
PROTHROMBIN TIME: 13.6 SECONDS (ref 11.7–14.2)
RBC # BLD AUTO: 4.57 10*6/MM3 (ref 4.14–5.8)
SODIUM SERPL-SCNC: 138 MMOL/L (ref 136–145)
WBC NRBC COR # BLD: 5.29 10*3/MM3 (ref 3.4–10.8)

## 2022-07-18 PROCEDURE — 99232 SBSQ HOSP IP/OBS MODERATE 35: CPT | Performed by: NURSE PRACTITIONER

## 2022-07-18 PROCEDURE — 82962 GLUCOSE BLOOD TEST: CPT

## 2022-07-18 PROCEDURE — 80053 COMPREHEN METABOLIC PANEL: CPT | Performed by: INTERNAL MEDICINE

## 2022-07-18 PROCEDURE — 63710000001 DIPHENHYDRAMINE PER 50 MG: Performed by: INTERNAL MEDICINE

## 2022-07-18 PROCEDURE — 85610 PROTHROMBIN TIME: CPT | Performed by: INTERNAL MEDICINE

## 2022-07-18 PROCEDURE — 85025 COMPLETE CBC W/AUTO DIFF WBC: CPT | Performed by: INTERNAL MEDICINE

## 2022-07-18 PROCEDURE — 25010000002 ONDANSETRON PER 1 MG: Performed by: INTERNAL MEDICINE

## 2022-07-18 RX ORDER — OXYCODONE HYDROCHLORIDE 5 MG/1
5 TABLET ORAL EVERY 4 HOURS PRN
Status: DISCONTINUED | OUTPATIENT
Start: 2022-07-18 | End: 2022-07-19 | Stop reason: HOSPADM

## 2022-07-18 RX ORDER — NALOXONE HCL 0.4 MG/ML
0.4 VIAL (ML) INJECTION
Status: DISCONTINUED | OUTPATIENT
Start: 2022-07-18 | End: 2022-07-19 | Stop reason: HOSPADM

## 2022-07-18 RX ORDER — MORPHINE SULFATE 2 MG/ML
2 INJECTION, SOLUTION INTRAMUSCULAR; INTRAVENOUS
Status: DISCONTINUED | OUTPATIENT
Start: 2022-07-18 | End: 2022-07-19 | Stop reason: HOSPADM

## 2022-07-18 RX ORDER — DIPHENHYDRAMINE HCL 25 MG
25 CAPSULE ORAL EVERY 6 HOURS PRN
Status: DISCONTINUED | OUTPATIENT
Start: 2022-07-18 | End: 2022-07-19 | Stop reason: HOSPADM

## 2022-07-18 RX ADMIN — POLYETHYLENE GLYCOL 3350 17 G: 17 POWDER, FOR SOLUTION ORAL at 21:56

## 2022-07-18 RX ADMIN — ONDANSETRON 4 MG: 2 INJECTION INTRAMUSCULAR; INTRAVENOUS at 14:11

## 2022-07-18 RX ADMIN — FAMOTIDINE 20 MG: 20 TABLET ORAL at 08:30

## 2022-07-18 RX ADMIN — Medication 10 ML: at 08:30

## 2022-07-18 RX ADMIN — SODIUM CHLORIDE 100 ML/HR: 9 INJECTION, SOLUTION INTRAVENOUS at 12:19

## 2022-07-18 RX ADMIN — PHENYTOIN SODIUM 200 MG: 100 CAPSULE, EXTENDED RELEASE ORAL at 21:56

## 2022-07-18 RX ADMIN — DIPHENHYDRAMINE HYDROCHLORIDE 25 MG: 25 CAPSULE ORAL at 17:38

## 2022-07-18 RX ADMIN — Medication 5 MG: at 21:56

## 2022-07-18 RX ADMIN — SODIUM CHLORIDE 100 ML/HR: 9 INJECTION, SOLUTION INTRAVENOUS at 01:33

## 2022-07-18 RX ADMIN — PHENYTOIN SODIUM 200 MG: 100 CAPSULE, EXTENDED RELEASE ORAL at 14:07

## 2022-07-18 RX ADMIN — OXYCODONE 5 MG: 5 TABLET ORAL at 21:56

## 2022-07-18 RX ADMIN — PHENYTOIN SODIUM 200 MG: 100 CAPSULE, EXTENDED RELEASE ORAL at 06:18

## 2022-07-18 RX ADMIN — FAMOTIDINE 20 MG: 20 TABLET ORAL at 17:38

## 2022-07-18 RX ADMIN — TRAMADOL HYDROCHLORIDE 50 MG: 50 TABLET, COATED ORAL at 10:28

## 2022-07-18 RX ADMIN — DOCUSATE SODIUM 50MG AND SENNOSIDES 8.6MG 2 TABLET: 8.6; 5 TABLET, FILM COATED ORAL at 21:55

## 2022-07-18 NOTE — PROGRESS NOTES
Name: Odell Renteria ADMIT: 2022   : 1975  PCP: Provider, No Known    MRN: [id number] LOS: 2 days   AGE/SEX: 47 y.o. male  ROOM: [facility]   Subjective   Chief Complaint   Patient presents with   • Jaundice       ROS  No f/c  No n/v  No cp/palp  No soa/cough    Objective   Vital Signs  Temp:  [98 °F (36.7 °C)-99.7 °F (37.6 °C)] 98.6 °F (37 °C)  Heart Rate:  [58-69] 65  Resp:  [16-18] 18  BP: (105-117)/(70-71) 110/71  SpO2:  [94 %-97 %] 95 %  on   ;   Device (Oxygen Therapy): room air  Body mass index is 29.53 kg/m².    Physical Exam    Results Review:       I reviewed the patient's new clinical results.  Results from last 7 days   Lab Units 22  0729 22  0641 22  0744   WBC 10*3/mm3 5.29 5.81 5.36   HEMOGLOBIN g/dL 13.2 13.9 13.8   PLATELETS 10*3/mm3 181 199 192     Results from last 7 days   Lab Units 22  0729 22  0641 22  0744   SODIUM mmol/L 138 139 138   POTASSIUM mmol/L 4.4 4.3 3.7   CHLORIDE mmol/L 104 105 104   CO2 mmol/L 25.0 24.0 24.0   BUN mg/dL 6 7 9   CREATININE mg/dL 0.80 0.84 0.90   GLUCOSE mg/dL 101* 95 112*   Estimated Creatinine Clearance: 127.1 mL/min (by C-G formula based on SCr of 0.8 mg/dL).  Results from last 7 days   Lab Units 22  0729 22  0641 22  0744   ALBUMIN g/dL 3.40* 3.60 3.70   BILIRUBIN mg/dL 14.2* 13.3* 11.7*   ALK PHOS U/L 183* 199* 198*   AST (SGOT) U/L 810* 864* 726*   ALT (SGPT) U/L 1,434* 1,468* 1,433*     Results from last 7 days   Lab Units 22  0729 22  0641 22  0744   CALCIUM mg/dL 8.7 8.9 8.7   ALBUMIN g/dL 3.40* 3.60 3.70         Coag   Results from last 7 days   Lab Units 22  1030 22  0744   INR  1.05 1.03     HbA1C   Lab Results   Component Value Date    HGBA1C 5.00 2022     Infection     Radiology(recent) US Liver    Result Date: 2022   No discrete liver lesion or biliary ductal dilatation identified. Hepatomegaly. Status post cholecystectomy. If there is further  clinical concern, enhanced liver imaging could be obtained for further evaluation.  This report was finalized on 7/17/2022 2:08 PM by Dr. Desean Coburn M.D.      No results found for: TROPONINT, TROPONINI, BNP  No components found for: TSH;2    famotidine, 20 mg, Oral, BID AC  insulin glargine, 10 Units, Subcutaneous, Q12H  insulin lispro, 0-7 Units, Subcutaneous, TID AC  phenytoin, 200 mg, Oral, Q8H  senna-docusate sodium, 2 tablet, Oral, BID  sodium chloride, 10 mL, Intravenous, Q12H      sodium chloride, 100 mL/hr, Last Rate: 100 mL/hr (07/18/22 1219)    Diet Regular      Assessment & Plan      Active Hospital Problems    Diagnosis  POA   • Schizophrenia (HCC) [F20.9]  Unknown   • Seizures (HCC) [R56.9]  Unknown   • Bipolar 1 disorder (HCC) [F31.9]  Unknown   • Elevated LFTs [R79.89]  Yes      Resolved Hospital Problems   No resolved problems to display.     47 y.o. male with a history of schizophrenia, seizure, bipolar disorder, type 2 diabetes mellitus presented to [facility] emergency room with 2-day history of jaundice ED work-up revealed elevated LFTs    · PRN agents for pain and nausea- will increase agents  · Suspected acute hepatits B. Awaiting confirmatory testing, defer any potential treatment to GI  · Supportive care with IVFs and above medications  · pepcid  · Insulin, monitor BG- stop lantus with low BG      DW RN      Al Moore MD  [facility]  07/18/22  14:16 EDT

## 2022-07-18 NOTE — PROGRESS NOTES
Gastroenterology   Inpatient Progress Note    Reason for Follow Up:  Elevated liver function test    Subjective  Interval History:   Patient with right upper quadrant abdominal pain, feels the same as when he was admitted.  Also nausea but no vomiting.  Pain was worse after eating.  Patient denies history of hepatitis.  Review of Systems:               Gastroenterology positive for abdominal pain    Objective     Vital Signs  Temp:  [98 °F (36.7 °C)-99.7 °F (37.6 °C)] 98.6 °F (37 °C)  Heart Rate:  [58-69] 65  Resp:  [16-18] 18  BP: (105-117)/(70-71) 110/71  Body mass index is 29.53 kg/m².                  Physical Exam:              General: patient awake, alert and cooperative              Eyes: no scleral icterus              Skin: warm and dry, not jaundiced              Abdomen: soft, normal bowel sounds, right upper quadrant pain, tender with palpation              Psychiatric: Appropriate affect and behavior                Results Review:                I reviewed the patient's new clinical results.    Results from last 7 days   Lab Units 07/18/22  0729 07/17/22  0641 07/16/22  0744   WBC 10*3/mm3 5.29 5.81 5.36   HEMOGLOBIN g/dL 13.2 13.9 13.8   HEMATOCRIT % 40.2 40.6 41.7   PLATELETS 10*3/mm3 181 199 192     Results from last 7 days   Lab Units 07/18/22  0729 07/17/22  0641 07/16/22  0744   SODIUM mmol/L 138 139 138   POTASSIUM mmol/L 4.4 4.3 3.7   CHLORIDE mmol/L 104 105 104   CO2 mmol/L 25.0 24.0 24.0   BUN mg/dL 6 7 9   CREATININE mg/dL 0.80 0.84 0.90   CALCIUM mg/dL 8.7 8.9 8.7   BILIRUBIN mg/dL 14.2* 13.3* 11.7*   ALK PHOS U/L 183* 199* 198*   ALT (SGPT) U/L 1,434* 1,468* 1,433*   AST (SGOT) U/L 810* 864* 726*   GLUCOSE mg/dL 101* 95 112*     Results from last 7 days   Lab Units 07/18/22  1030 07/16/22  0744   INR  1.05 1.03     Lab Results   Lab Value Date/Time    LIPASE 32 07/16/2022 0744    LIPASE 81 (H) 12/09/2021 0912       Radiology:  US Liver   Final Result       No discrete liver lesion or  biliary ductal dilatation identified.   Hepatomegaly. Status post cholecystectomy. If there is further clinical   concern, enhanced liver imaging could be obtained for further   evaluation.       This report was finalized on 7/17/2022 2:08 PM by Dr. Desean Coburn M.D.          CT Abdomen Pelvis Without Contrast   Final Result       Inflammatory stranding in the region of the second portion of the   duodenum, could be evidence of duodenitis.       No urolithiasis or hydronephrosis.       Splenomegaly.       This report was finalized on 7/16/2022 9:39 AM by Dr. Desean Coburn M.D.              Assessment & Plan     Patient Active Problem List   Diagnosis   • Elevated LFTs   • Schizophrenia (HCC)   • Seizures (HCC)   • Bipolar 1 disorder (HCC)       Assessment:  1. Transaminitis, hyperbilirubinemia with significant elevation in liver function test consistent with acute hepatitis  2. HBV core IgM reactive  3. Jaundice  4. Vomiting, heartburn  5. Status postcholecystectomy  6. Abnormal CT scan of the duodenum, could be evidence of duodenitis        Plan:  · Reviewed right upper quadrant ultrasound, no discrete liver mass identified  · Hepatitis B surface antigen and hepatitis B DNA blood test pending  · Smooth muscle antibody and mitochondrial antibody pending  · Continue supportive measures, with pain medication, IV fluids and antiemetics per primary    I discussed the patients findings and my recommendations with patient and nursing staff.           Monique SHELTON  [facility] [address] [address]

## 2022-07-18 NOTE — PLAN OF CARE
Problem: Adult Inpatient Plan of Care  Goal: Plan of Care Review  Outcome: Ongoing, Progressing  Flowsheets (Taken 7/18/2022 1811)  Progress: no change  Plan of Care Reviewed With: patient  Outcome Evaluation: Patient medicated once with tramadol this morning for pain relief. Pain medications adjusted per attending MD, but patient has since denied any need after initial dose this morning. This evening, patient frustrated with care because he hasn't received any test results. IV RN attempted to replace IV, but patient currently refusing any more sticks - notified MD of situation. Will continue to monitor.

## 2022-07-19 VITALS
OXYGEN SATURATION: 96 % | HEIGHT: 69 IN | SYSTOLIC BLOOD PRESSURE: 105 MMHG | RESPIRATION RATE: 18 BRPM | DIASTOLIC BLOOD PRESSURE: 61 MMHG | HEART RATE: 68 BPM | TEMPERATURE: 97.3 F | WEIGHT: 200 LBS | BODY MASS INDEX: 29.62 KG/M2

## 2022-07-19 LAB
ALBUMIN SERPL-MCNC: 3.2 G/DL (ref 3.5–5.2)
ALBUMIN/GLOB SERPL: 1.1 G/DL
ALP SERPL-CCNC: 195 U/L (ref 39–117)
ALT SERPL W P-5'-P-CCNC: 1449 U/L (ref 1–41)
ANION GAP SERPL CALCULATED.3IONS-SCNC: 11 MMOL/L (ref 5–15)
AST SERPL-CCNC: 838 U/L (ref 1–40)
BASOPHILS # BLD AUTO: 0.06 10*3/MM3 (ref 0–0.2)
BASOPHILS NFR BLD AUTO: 1 % (ref 0–1.5)
BILIRUB SERPL-MCNC: 16.5 MG/DL (ref 0–1.2)
BUN SERPL-MCNC: 7 MG/DL (ref 6–20)
BUN/CREAT SERPL: 8.5 (ref 7–25)
CALCIUM SPEC-SCNC: 8.9 MG/DL (ref 8.6–10.5)
CHLORIDE SERPL-SCNC: 100 MMOL/L (ref 98–107)
CO2 SERPL-SCNC: 24 MMOL/L (ref 22–29)
CREAT SERPL-MCNC: 0.82 MG/DL (ref 0.76–1.27)
DEPRECATED RDW RBC AUTO: 42.1 FL (ref 37–54)
EGFRCR SERPLBLD CKD-EPI 2021: 109 ML/MIN/1.73
EOSINOPHIL # BLD AUTO: 0.17 10*3/MM3 (ref 0–0.4)
EOSINOPHIL NFR BLD AUTO: 2.8 % (ref 0.3–6.2)
ERYTHROCYTE [DISTWIDTH] IN BLOOD BY AUTOMATED COUNT: 13.8 % (ref 12.3–15.4)
GLOBULIN UR ELPH-MCNC: 2.9 GM/DL
GLUCOSE BLDC GLUCOMTR-MCNC: 84 MG/DL (ref 70–130)
GLUCOSE SERPL-MCNC: 82 MG/DL (ref 65–99)
HBV SURFACE AG SERPL QL IA: NORMAL
HBV SURFACE AG SERPL QL NT: POSITIVE
HCT VFR BLD AUTO: 39.2 % (ref 37.5–51)
HGB BLD-MCNC: 13.4 G/DL (ref 13–17.7)
IMM GRANULOCYTES # BLD AUTO: 0.03 10*3/MM3 (ref 0–0.05)
IMM GRANULOCYTES NFR BLD AUTO: 0.5 % (ref 0–0.5)
INR PPP: 1.05 (ref 0.9–1.1)
LYMPHOCYTES # BLD AUTO: 1.37 10*3/MM3 (ref 0.7–3.1)
LYMPHOCYTES NFR BLD AUTO: 22.4 % (ref 19.6–45.3)
MCH RBC QN AUTO: 29.1 PG (ref 26.6–33)
MCHC RBC AUTO-ENTMCNC: 34.2 G/DL (ref 31.5–35.7)
MCV RBC AUTO: 85.2 FL (ref 79–97)
MITOCHONDRIA M2 IGG SER-ACNC: <20 UNITS (ref 0–20)
MONOCYTES # BLD AUTO: 0.7 10*3/MM3 (ref 0.1–0.9)
MONOCYTES NFR BLD AUTO: 11.5 % (ref 5–12)
NEUTROPHILS NFR BLD AUTO: 3.78 10*3/MM3 (ref 1.7–7)
NEUTROPHILS NFR BLD AUTO: 61.8 % (ref 42.7–76)
NRBC BLD AUTO-RTO: 0 /100 WBC (ref 0–0.2)
PLATELET # BLD AUTO: 182 10*3/MM3 (ref 140–450)
PMV BLD AUTO: 10.7 FL (ref 6–12)
POTASSIUM SERPL-SCNC: 4.1 MMOL/L (ref 3.5–5.2)
PROT SERPL-MCNC: 6.1 G/DL (ref 6–8.5)
PROTHROMBIN TIME: 13.6 SECONDS (ref 11.7–14.2)
RBC # BLD AUTO: 4.6 10*6/MM3 (ref 4.14–5.8)
SMA IGG SER-ACNC: 42 UNITS (ref 0–19)
SODIUM SERPL-SCNC: 135 MMOL/L (ref 136–145)
WBC NRBC COR # BLD: 6.11 10*3/MM3 (ref 3.4–10.8)

## 2022-07-19 PROCEDURE — 80053 COMPREHEN METABOLIC PANEL: CPT | Performed by: INTERNAL MEDICINE

## 2022-07-19 PROCEDURE — 82962 GLUCOSE BLOOD TEST: CPT

## 2022-07-19 PROCEDURE — 85610 PROTHROMBIN TIME: CPT | Performed by: INTERNAL MEDICINE

## 2022-07-19 PROCEDURE — 85025 COMPLETE CBC W/AUTO DIFF WBC: CPT | Performed by: INTERNAL MEDICINE

## 2022-07-19 RX ORDER — OLANZAPINE 10 MG/1
5 INJECTION, POWDER, LYOPHILIZED, FOR SOLUTION INTRAMUSCULAR EVERY 8 HOURS PRN
Status: DISCONTINUED | OUTPATIENT
Start: 2022-07-19 | End: 2022-07-19 | Stop reason: HOSPADM

## 2022-07-19 RX ORDER — ONDANSETRON 4 MG/1
4 TABLET, FILM COATED ORAL EVERY 8 HOURS PRN
Qty: 5 TABLET | Refills: 0 | Status: SHIPPED | OUTPATIENT
Start: 2022-07-19

## 2022-07-19 RX ORDER — LORAZEPAM 1 MG/1
1 TABLET ORAL EVERY 6 HOURS PRN
Status: DISCONTINUED | OUTPATIENT
Start: 2022-07-19 | End: 2022-07-19 | Stop reason: HOSPADM

## 2022-07-19 RX ADMIN — DOCUSATE SODIUM 50MG AND SENNOSIDES 8.6MG 2 TABLET: 8.6; 5 TABLET, FILM COATED ORAL at 08:37

## 2022-07-19 RX ADMIN — FAMOTIDINE 20 MG: 20 TABLET ORAL at 08:37

## 2022-07-19 RX ADMIN — PHENYTOIN SODIUM 200 MG: 100 CAPSULE, EXTENDED RELEASE ORAL at 06:54

## 2022-07-19 NOTE — NURSING NOTE
"Patient began screaming at the nurse aid. This Rn entered the room to try and see what was happening and patient began screaming that he wanted to leave and that no one knew what was wrong with him and that he wanted out of here \"right now.\". He was yelling obscenities and cursing at both the RN and aid. I retrieved AMA papers and attempted to let the patient fill them out/explain them. He got even more aggravated and screamed more obscenities and said he was \"going to ella\" us. He became more and more threatening the more staff tried to calm him. Security was called and the nurse manager attempted to intervene. Upon speaking with Dr. Moore, I informed the patient of his diagnosis that was previously explained to him by the MD. He has since calmed down and is now resting in his room.     0900-patient wishes to leave AMA. Paperwork prepared and MD notified. Patient left without signing and used threatening and abusive behavior before leaving. Security notified.  "

## 2022-07-19 NOTE — DISCHARGE SUMMARY
NAME: Odell Renteria ADMIT: 2022   : 1975  PCP: Provider, No Known    MRN: [id number] LOS: 3 days   AGE/SEX: 47 y.o. male  ROOM: P492/1     Date of Admission:  2022  Date of Discharge:  2022    PCP: Provider, No Known    CHIEF COMPLAINT  Jaundice      DISCHARGE DIAGNOSIS  Active Hospital Problems    Diagnosis  POA   • Schizophrenia (HCC) [F20.9]  Unknown   • Seizures (HCC) [R56.9]  Unknown   • Bipolar 1 disorder (HCC) [F31.9]  Unknown   • Elevated LFTs [R79.89]  Yes      Resolved Hospital Problems   No resolved problems to display.       SECONDARY DIAGNOSES  Past Medical History:   Diagnosis Date   • Bipolar disorder (HCC)    • Diabetes mellitus (HCC)    • Schizophrenia (HCC)    • Seizures (HCC)        CONSULTS   GI    HOSPITAL COURSE  47 y.o. male with a history of seizure, type 2 diabetes mellitus presented to [facility] emergency room with 2-day history of jaundice ED work-up revealed elevated LFTs     Suspected acute hepatits B. Awaiting confirmatory testing.  He was given support care with IV fluids as well as as needed agents for pain and nausea.  His LFTs remained elevated.  He had grown frustrated both on  and  with being in the hospital.  On  he had ripped out his IV and refused for another 1 to be placed.  On  he was verbally aggressive with the staff and wanted to leave AGAINST MEDICAL ADVICE.  In some ways I understand him wanting to leave as the current treatment was supportive care with awaiting some of his testing and GI recommendations.  That being said I had still wanted to observe him in the hospital for longer to see more improvement of his liver function testing but he did not wish to stay any longer in the hospital- though we cannot hold him in the hospital against his will.  I had discussed with his nurse to encourage to him and his wife to follow-up with GI very soon to have potential treatment for hepatitis B as well as following his  blood work.    DIAGNOSTICS  US Liver [[id number]] Aj as Reviewed   Order Status: Completed Collected: 07/17/22 1406    Updated: 07/17/22 1435   Narrative:     US LIVER-       INDICATIONS: Elevated liver enzymes       TECHNIQUE: Ultrasound of the right upper quadrant       COMPARISON: CT from 07/16/2022       FINDINGS:       The gallbladder is absent.       No intrahepatic or extrahepatic biliary ductal dilatation is   demonstrated. The common biliary duct caliber is measured at mm.       The liver is enlarged, 20.4 cm. No liver lesion is identified.   Diffusely, the echogenicity of the liver is otherwise in the range of   normal relative to that of the right kidney.       The pancreas is partly obscured. The right kidney, 10.2 cm, and the   pancreas otherwise appear unremarkable.                   Impression:         No discrete liver lesion or biliary ductal dilatation identified.   Hepatomegaly. Status post cholecystectomy. If there is further clinical   concern, enhanced liver imaging could be obtained for further   evaluation.       This report was finalized on 7/17/2022 2:08 PM by Dr. Desean Coburn M.D.        CT Abdomen Pelvis Without Contrast [[id number]] Aj as Reviewed   Order Status: Completed Collected: 07/16/22 0932    Updated: 07/16/22 0942   Narrative:     CT ABDOMEN PELVIS WO CONTRAST-       INDICATIONS: Jaundice, abdominal swelling       TECHNIQUE: Radiation dose reduction techniques were utilized, including   automated exposure control and exposure modulation based on body size.   Unenhanced ABDOMEN AND PELVIS CT       COMPARISON: None available       FINDINGS:       The spleen is enlarged, 14.1 cm.       The gallbladder is surgically absent.       The urinary bladder is mostly empty, limiting its assessment.       Otherwise unremarkable unenhanced appearance of the liver, spleen,   adrenal glands, pancreas, kidneys, bladder.       No bowel obstruction or abnormal bowel thickening is  identified.   Inflammatory stranding is apparent in the region of the second portion   of the duodenum, could be evidence of duodenitis. The appendix does not   appear inflamed.       No free intraperitoneal gas or free fluid.       Borderline prominent portacaval lymph nodes are noted, for example 1.2   cm short axis on axial image 44, nonspecific, clinical correlation and   follow-up suggested.       Abdominal aorta is not aneurysmal.       The lung bases are clear.       Degenerative changes are seen in the spine. No acute fracture is   identified.               Impression:         Inflammatory stranding in the region of the second portion of the   duodenum, could be evidence of duodenitis.       No urolithiasis or hydronephrosis.            Collected Updated Procedure Result Status    07/19/2022 0730 07/19/2022 0830 Comprehensive Metabolic Panel [[id number]]    (Abnormal)   Blood    Final result Component Value Units   Glucose 82 mg/dL   BUN 7 mg/dL   Creatinine 0.82 mg/dL   Sodium 135 Low  mmol/L   Potassium 4.1  mmol/L   Chloride 100 mmol/L   CO2 24.0 mmol/L   Calcium 8.9 mg/dL   Total Protein 6.1 g/dL   Albumin 3.20 Low  g/dL   ALT (SGPT) 1,449 High  U/L   AST (SGOT) 838 High  U/L   Alkaline Phosphatase 195 High  U/L   Total Bilirubin 16.5 High  mg/dL   Globulin 2.9 gm/dL   A/G Ratio 1.1 g/dL   BUN/Creatinine Ratio 8.5    Anion Gap 11.0 mmol/L   eGFR 109.0  mL/min/1.73          07/19/2022 0730 07/19/2022 0802 Protime-INR [[id number]]   Blood    Final result Component Value Units   Protime 13.6 Seconds   INR 1.05           07/19/2022 0730 07/19/2022 0754 CBC Auto Differential [[id number]]   Blood    Final result Component Value Units   WBC 6.11 10*3/mm3   RBC 4.60 10*6/mm3   Hemoglobin 13.4 g/dL   Hematocrit 39.2 %   MCV 85.2 fL   MCH 29.1 pg   MCHC 34.2 g/dL   RDW 13.8 %   RDW-SD 42.1 fl   MPV 10.7 fL   Platelets 182 10*3/mm3   Neutrophil % 61.8 %   Lymphocyte % 22.4 %   Monocyte % 11.5 %   Eosinophil % 2.8 %    Basophil % 1.0 %   Immature Grans % 0.5 %   Neutrophils, Absolute 3.78 10*3/mm3   Lymphocytes, Absolute 1.37 10*3/mm3            07/17/2022 0641 07/17/2022 0855 Hepatitis Panel, Acute [[id number]]    (Abnormal)   Blood    Final result Component Value   Hepatitis B Surface Ag --   Hep A IgM Non-Reactive   Hep B C IgM Reactive Abnormal    Hepatitis C Ab Non-Reactive            PHYSICAL EXAM  Objective    Pt did not wait for me to do full exam on him  CONDITION ON DISCHARGE  Stable.      DISCHARGE DISPOSITION   Left Against Medical Advice      DISCHARGE MEDICATIONS       Your medication list      ASK your doctor about these medications      Instructions Last Dose Given Next Dose Due   DILANTIN PO      Take 200 mg by mouth 3 (Three) Times a Day.       insulin glargine 100 UNIT/ML injection  Commonly known as: LANTUS, SEMGLEE      Inject 10 Units under the skin into the appropriate area as directed 2 (Two) Times a Day.             No future appointments.    TEST  RESULTS PENDING AT DISCHARGE  Pending Labs     Order Current Status    Anti-Smooth Muscle Antibody Titer In process    Hepatitis B DNA, Quantitative, PCR In process    Hepatitis B Surface Antigen In process    Mitochondrial Antibodies, M2 In process             Al Moore MD  [facility]  07/19/22  11:06 EDT      Time: greater than 32 minutes on discharge  It was a pleasure taking care of this patient while in the hospital.

## 2022-07-20 LAB
HBV DNA SERPL NAA+PROBE-ACNC: NORMAL IU/ML
HBV DNA SERPL NAA+PROBE-ACNC: NORMAL IU/ML
HBV DNA SERPL NAA+PROBE-LOG IU: 7.01 LOG10 IU/ML
REF LAB TEST REF RANGE: NORMAL

## 2022-07-20 NOTE — PROGRESS NOTES
Case Management Discharge Note      Final Note: Left AMA on 7/19/22. RAYMOND Friedman RN, CCP         Selected Continued Care - Discharged on 7/19/2022 Admission date: 7/16/2022 - Discharge disposition: Left Against Medical Advice    Destination    No services have been selected for the patient.              Durable Medical Equipment    No services have been selected for the patient.              Dialysis/Infusion    No services have been selected for the patient.              Home Medical Care    No services have been selected for the patient.              Therapy    No services have been selected for the patient.              Community Resources    No services have been selected for the patient.              Community & DME    No services have been selected for the patient.                  Transportation Services  Private: Car    Final Discharge Disposition Code: 07 - left AMA